# Patient Record
Sex: FEMALE | URBAN - METROPOLITAN AREA
[De-identification: names, ages, dates, MRNs, and addresses within clinical notes are randomized per-mention and may not be internally consistent; named-entity substitution may affect disease eponyms.]

---

## 2017-07-05 ENCOUNTER — EMERGENCY (EMERGENCY)
Age: 42
LOS: 1 days | Discharge: HOME | End: 2017-07-05
Attending: EMERGENCY MEDICINE | Admitting: EMERGENCY MEDICINE
Payer: COMMERCIAL

## 2017-07-05 VITALS
RESPIRATION RATE: 28 BRPM | HEART RATE: 73 BPM | OXYGEN SATURATION: 100 % | TEMPERATURE: 98 F | SYSTOLIC BLOOD PRESSURE: 119 MMHG | DIASTOLIC BLOOD PRESSURE: 90 MMHG

## 2017-07-05 LAB
ALBUMIN SERPL ELPH-MCNC: 4 G/DL — SIGNIFICANT CHANGE UP (ref 3.3–5)
ALP SERPL-CCNC: 76 U/L — SIGNIFICANT CHANGE UP (ref 40–120)
ALT FLD-CCNC: 20 U/L — SIGNIFICANT CHANGE UP (ref 4–33)
AST SERPL-CCNC: 20 U/L — SIGNIFICANT CHANGE UP (ref 4–32)
BASOPHILS # BLD AUTO: 0.05 K/UL — SIGNIFICANT CHANGE UP (ref 0–0.2)
BASOPHILS NFR BLD AUTO: 0.5 % — SIGNIFICANT CHANGE UP (ref 0–2)
BILIRUB SERPL-MCNC: 0.2 MG/DL — SIGNIFICANT CHANGE UP (ref 0.2–1.2)
BUN SERPL-MCNC: 17 MG/DL — SIGNIFICANT CHANGE UP (ref 7–23)
CALCIUM SERPL-MCNC: 9 MG/DL — SIGNIFICANT CHANGE UP (ref 8.4–10.5)
CHLORIDE SERPL-SCNC: 108 MMOL/L — HIGH (ref 98–107)
CO2 SERPL-SCNC: 19 MMOL/L — LOW (ref 22–31)
CREAT SERPL-MCNC: 0.76 MG/DL — SIGNIFICANT CHANGE UP (ref 0.5–1.3)
EOSINOPHIL # BLD AUTO: 0.15 K/UL — SIGNIFICANT CHANGE UP (ref 0–0.5)
EOSINOPHIL NFR BLD AUTO: 1.5 % — SIGNIFICANT CHANGE UP (ref 0–6)
GLUCOSE SERPL-MCNC: 99 MG/DL — SIGNIFICANT CHANGE UP (ref 70–99)
HCT VFR BLD CALC: 36.7 % — SIGNIFICANT CHANGE UP (ref 34.5–45)
HGB BLD-MCNC: 11.9 G/DL — SIGNIFICANT CHANGE UP (ref 11.5–15.5)
IMM GRANULOCYTES # BLD AUTO: 0.04 # — SIGNIFICANT CHANGE UP
IMM GRANULOCYTES NFR BLD AUTO: 0.4 % — SIGNIFICANT CHANGE UP (ref 0–1.5)
LYMPHOCYTES # BLD AUTO: 2.99 K/UL — SIGNIFICANT CHANGE UP (ref 1–3.3)
LYMPHOCYTES # BLD AUTO: 29.9 % — SIGNIFICANT CHANGE UP (ref 13–44)
MCHC RBC-ENTMCNC: 30.1 PG — SIGNIFICANT CHANGE UP (ref 27–34)
MCHC RBC-ENTMCNC: 32.4 % — SIGNIFICANT CHANGE UP (ref 32–36)
MCV RBC AUTO: 92.7 FL — SIGNIFICANT CHANGE UP (ref 80–100)
MONOCYTES # BLD AUTO: 1 K/UL — HIGH (ref 0–0.9)
MONOCYTES NFR BLD AUTO: 10 % — SIGNIFICANT CHANGE UP (ref 2–14)
NEUTROPHILS # BLD AUTO: 5.78 K/UL — SIGNIFICANT CHANGE UP (ref 1.8–7.4)
NEUTROPHILS NFR BLD AUTO: 57.7 % — SIGNIFICANT CHANGE UP (ref 43–77)
NRBC # FLD: 0 — SIGNIFICANT CHANGE UP
PLATELET # BLD AUTO: 268 K/UL — SIGNIFICANT CHANGE UP (ref 150–400)
PMV BLD: 10.5 FL — SIGNIFICANT CHANGE UP (ref 7–13)
POTASSIUM SERPL-MCNC: 3.9 MMOL/L — SIGNIFICANT CHANGE UP (ref 3.5–5.3)
POTASSIUM SERPL-SCNC: 3.9 MMOL/L — SIGNIFICANT CHANGE UP (ref 3.5–5.3)
PROT SERPL-MCNC: 7.1 G/DL — SIGNIFICANT CHANGE UP (ref 6–8.3)
RBC # BLD: 3.96 M/UL — SIGNIFICANT CHANGE UP (ref 3.8–5.2)
RBC # FLD: 13 % — SIGNIFICANT CHANGE UP (ref 10.3–14.5)
SODIUM SERPL-SCNC: 142 MMOL/L — SIGNIFICANT CHANGE UP (ref 135–145)
WBC # BLD: 10.01 K/UL — SIGNIFICANT CHANGE UP (ref 3.8–10.5)
WBC # FLD AUTO: 10.01 K/UL — SIGNIFICANT CHANGE UP (ref 3.8–10.5)

## 2017-07-05 PROCEDURE — 70450 CT HEAD/BRAIN W/O DYE: CPT | Mod: 26

## 2017-07-05 PROCEDURE — 72125 CT NECK SPINE W/O DYE: CPT | Mod: 26

## 2017-07-05 PROCEDURE — 99220: CPT

## 2017-07-05 PROCEDURE — 72141 MRI NECK SPINE W/O DYE: CPT | Mod: 26

## 2017-07-05 RX ORDER — ACETAMINOPHEN 500 MG
650 TABLET ORAL ONCE
Refills: 0 | Status: COMPLETED | OUTPATIENT
Start: 2017-07-05 | End: 2017-07-05

## 2017-07-05 RX ORDER — IBUPROFEN 200 MG
600 TABLET ORAL ONCE
Refills: 0 | Status: COMPLETED | OUTPATIENT
Start: 2017-07-05 | End: 2017-07-05

## 2017-07-05 RX ORDER — KETOROLAC TROMETHAMINE 30 MG/ML
30 SYRINGE (ML) INJECTION EVERY 6 HOURS
Refills: 0 | Status: DISCONTINUED | OUTPATIENT
Start: 2017-07-05 | End: 2017-07-05

## 2017-07-05 RX ORDER — DIAZEPAM 5 MG
5 TABLET ORAL EVERY 6 HOURS
Refills: 0 | Status: DISCONTINUED | OUTPATIENT
Start: 2017-07-05 | End: 2017-07-05

## 2017-07-05 RX ORDER — IBUPROFEN 200 MG
400 TABLET ORAL ONCE
Refills: 0 | Status: DISCONTINUED | OUTPATIENT
Start: 2017-07-05 | End: 2017-07-05

## 2017-07-05 RX ORDER — DIAZEPAM 5 MG
5 TABLET ORAL ONCE
Refills: 0 | Status: DISCONTINUED | OUTPATIENT
Start: 2017-07-05 | End: 2017-07-05

## 2017-07-05 RX ORDER — ONDANSETRON 8 MG/1
4 TABLET, FILM COATED ORAL ONCE
Refills: 0 | Status: COMPLETED | OUTPATIENT
Start: 2017-07-05 | End: 2017-07-05

## 2017-07-05 RX ADMIN — Medication 650 MILLIGRAM(S): at 19:09

## 2017-07-05 RX ADMIN — ONDANSETRON 4 MILLIGRAM(S): 8 TABLET, FILM COATED ORAL at 16:57

## 2017-07-05 RX ADMIN — Medication 5 MILLIGRAM(S): at 23:51

## 2017-07-05 RX ADMIN — Medication 650 MILLIGRAM(S): at 19:08

## 2017-07-05 RX ADMIN — Medication 30 MILLIGRAM(S): at 23:51

## 2017-07-05 RX ADMIN — Medication 600 MILLIGRAM(S): at 16:57

## 2017-07-05 RX ADMIN — Medication 600 MILLIGRAM(S): at 13:42

## 2017-07-05 NOTE — ED CDU PROVIDER NOTE - PLAN OF CARE
Rest, ice, elevate area.  Take Ibuprofen 600 mg and Valium 5 mg for pain as directed . Apply Arnica gel 4 times per day for muscular pain as needed. Follow up with PMD within 48-72 hrs.  Any worsening pain, swelling, weakness, numbness return to ED. Ortho referral list provided if needed.

## 2017-07-05 NOTE — ED PROVIDER NOTE - MUSCULOSKELETAL MINIMAL EXAM
Mild TTP poster cervical midline, TTP right paraspinal cervical region. Nontender spinal midlines (TLS)

## 2017-07-05 NOTE — ED CDU PROVIDER NOTE - PROGRESS NOTE DETAILS
CDU SHARMIN Rivera Addendum------  This patient was signed out to me by ED attending Dr. Posey; test results reviewed.  In interim, pt has been resting comfortably.  On exam, pt is lying supine on stretcher with rigid c-collar in place; pt has no motorsensory deficits on exam and has benign cardiopulmonary exam.  Toradol and diazepam ordered for symptomatic improvement; CDU plan discussed with pt who verbalizes agreement with plan.  Pt stable at present; will continue to monitor / reassess. CDU PA Miguel Addendum----  Pt. resting comfortably in interim; no issues to date.  MRI was completed shortly after CDU arrival; official results are pending at this time.  Pt. stable at present; will continue to monitor / reassess.  Pt. will be signed out to CDU day PA and attending at 0700 hrs. CDU PA Aftab: PT assessed at bedside, Cervical MRI results were negative, pt is feeling much better. VSS. Will D/C pt home with pain meds and muscle relaxant. DISCHARGE NOTE-CLAUDETTE JIANG MD   Patient is alert and oriented to person, place and date.  Patient is appropriate.  Patient denies any new or worsening physical complaints.  Patient feeling better and wants to go home.  Results of testing discussed with patient at bedside.   Copies of results provided to patient for discharge.  Patient understands that they develop new or worsening symptoms, to return to the ER immediately or call their PMD.  Patient's vital signs remain stable.  Stable for discharge.    Discharge to home.    -Claudette Jiang MD
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
## 2017-07-05 NOTE — ED CDU PROVIDER NOTE - PHYSICAL EXAMINATION
neck:  c collar in place, trachea midline, mild ttp cervical midline, no step offs, mild ttp b/l paraspinal cervical region  right shoulder:  no deformity / swelling / ecchymoses, full rom, distally nv intact, mild ttp anterior aspect

## 2017-07-05 NOTE — ED PROVIDER NOTE - PROGRESS NOTE DETAILS
SHARMIN Sanchez: pt with concern for ligamentous injury on CT will obtain MRI C-Spine to better evaluate.  Pt resting comfortably on stretcher C-Collar in place pt denies weakness, numbness, tingling.  Pt advised of results will place in CDU for MRI d/w SHARMIN Rivera.  Labs to be follow up by SHARMIN Rivera.  Pt advised to not ambulate and to main C-Collar. SHARMIN Sanchez: pt with concern for ligamentous injury on CT will obtain MRI C-Spine to better evaluate.  Pt resting comfortably on stretcher C-Collar in place pt denies weakness, numbness, tingling; strength 5/5 b/l upper extremities.  Pt advised of results will place in CDU for MRI d/w SHARMIN Rivera.  Labs to be follow up by SHARMIN Rivera.  Pt advised to not ambulate and to main C-Collar.

## 2017-07-05 NOTE — ED PROVIDER NOTE - UPPER EXTREMITY EXAM, RIGHT
Mild TTP anterior aspect of right shoulder. No ecchymosis, nor deformity. FROM. Distally neurovascularly intact.
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
## 2017-07-05 NOTE — ED CDU PROVIDER NOTE - ATTENDING CONTRIBUTION TO CARE
CDU MD MCNEILL:  I performed a face to face bedside interview with patient regarding history of present illness, review of symptoms and past medical history. I completed an independent physical exam.  I have discussed patient's plan of care with PA.   I agree with note as stated above, having amended the EMR as needed to reflect my findings. I have discussed the assessment and plan of care.  This includes during the time I functioned as the attending physician for this patient.    HPI / ROS / PE / MDM written by myself.
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
## 2017-07-05 NOTE — ED PROVIDER NOTE - CHPI ED SYMPTOMS NEG
no LOC, no N/V, no incontinence, no numbness, no tingling, no other complaints/no loss of consciousness
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
## 2017-07-05 NOTE — ED CDU PROVIDER NOTE - CONSTITUTIONAL, MLM
normal... Well appearing, well nourished, awake, alert, oriented to person, place, time/situation.  Anxious
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
## 2017-07-06 VITALS
SYSTOLIC BLOOD PRESSURE: 122 MMHG | DIASTOLIC BLOOD PRESSURE: 72 MMHG | TEMPERATURE: 98 F | HEART RATE: 77 BPM | OXYGEN SATURATION: 100 % | RESPIRATION RATE: 18 BRPM

## 2017-07-06 LAB — HBA1C BLD-MCNC: 5.7 % — HIGH (ref 4–5.6)

## 2017-07-06 PROCEDURE — 99217: CPT

## 2017-07-06 RX ORDER — ONDANSETRON 8 MG/1
4 TABLET, FILM COATED ORAL ONCE
Refills: 0 | Status: DISCONTINUED | OUTPATIENT
Start: 2017-07-06 | End: 2017-07-06

## 2017-07-06 RX ORDER — DIAZEPAM 5 MG
1 TABLET ORAL
Qty: 9 | Refills: 0
Start: 2017-07-06 | End: 2017-07-09

## 2017-07-06 RX ORDER — ONDANSETRON 8 MG/1
4 TABLET, FILM COATED ORAL ONCE
Refills: 0 | Status: COMPLETED | OUTPATIENT
Start: 2017-07-06 | End: 2017-07-06

## 2017-07-06 RX ORDER — IBUPROFEN 200 MG
1 TABLET ORAL
Qty: 28 | Refills: 0
Start: 2017-07-06 | End: 2017-07-13

## 2017-07-06 RX ADMIN — Medication 30 MILLIGRAM(S): at 00:05

## 2017-07-06 RX ADMIN — ONDANSETRON 4 MILLIGRAM(S): 8 TABLET, FILM COATED ORAL at 09:35
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
## 2018-11-20 NOTE — ED CDU PROVIDER NOTE - RESPIRATORY, MLM
INSERT PERIPHERAL IV    Patient location during procedure: OR  Line placed for Sedation.  Performed By   CRNA: Brunilda Galeano CRNA  Preanesthetic Checklist  Completed: patient identified, surgical consent, pre-op evaluation, timeout performed, IV checked, risks and benefits discussed and monitors and equipment checked  Peripheral IV Prep   Patient position: supine   Prep: ChloraPrep  Patient monitoring: heart rate, cardiac monitor and continuous pulse ox  Peripheral IV Procedure   Location:  Hand  Catheter size: 22 G         Post Assessment   Dressing Type: transparent and tape.    IV Dressing/Site: clean, dry and intact             Breath sounds clear and equal bilaterally.